# Patient Record
Sex: FEMALE | NOT HISPANIC OR LATINO | URBAN - METROPOLITAN AREA
[De-identification: names, ages, dates, MRNs, and addresses within clinical notes are randomized per-mention and may not be internally consistent; named-entity substitution may affect disease eponyms.]

---

## 2021-11-13 ENCOUNTER — EMERGENCY (EMERGENCY)
Facility: HOSPITAL | Age: 21
LOS: 1 days | Discharge: HOME | End: 2021-11-13
Attending: EMERGENCY MEDICINE | Admitting: EMERGENCY MEDICINE
Payer: SELF-PAY

## 2021-11-13 VITALS
RESPIRATION RATE: 18 BRPM | DIASTOLIC BLOOD PRESSURE: 71 MMHG | HEART RATE: 72 BPM | TEMPERATURE: 98 F | SYSTOLIC BLOOD PRESSURE: 107 MMHG | OXYGEN SATURATION: 98 %

## 2021-11-13 VITALS
OXYGEN SATURATION: 100 % | HEART RATE: 88 BPM | DIASTOLIC BLOOD PRESSURE: 71 MMHG | RESPIRATION RATE: 18 BRPM | SYSTOLIC BLOOD PRESSURE: 104 MMHG | TEMPERATURE: 98 F
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
## 2021-11-13 DIAGNOSIS — F10.129 ALCOHOL ABUSE WITH INTOXICATION, UNSPECIFIED: ICD-10-CM

## 2021-11-13 DIAGNOSIS — R41.82 ALTERED MENTAL STATUS, UNSPECIFIED: ICD-10-CM

## 2021-11-13 PROCEDURE — 99053 MED SERV 10PM-8AM 24 HR FAC: CPT

## 2021-11-13 PROCEDURE — 99284 EMERGENCY DEPT VISIT MOD MDM: CPT

## 2021-11-13 RX ORDER — ONDANSETRON 8 MG/1
4 TABLET, FILM COATED ORAL ONCE
Refills: 0 | Status: COMPLETED | OUTPATIENT
Start: 2021-11-13 | End: 2021-11-13

## 2021-11-13 RX ADMIN — ONDANSETRON 4 MILLIGRAM(S): 8 TABLET, FILM COATED ORAL at 03:21

## 2021-11-13 NOTE — ED ADULT NURSE NOTE - OBJECTIVE STATEMENT
Pt. BIBEMS for AMS. As per EMS, pt. was found "passed out: in bathroom of club. Pt. is unable to answer any questions at this time. Fall precautions in place. Pending sobriety.

## 2021-11-13 NOTE — ED PROVIDER NOTE - PROGRESS NOTE DETAILS
patient awake, coherent, ambulatory, sister here to take her home. has no acute medical complaints. no head injury.

## 2021-11-13 NOTE — ED PROVIDER NOTE - PATIENT PORTAL LINK FT
You can access the FollowMyHealth Patient Portal offered by [facility] by registering at the following website: [email or web address]. By joining [facility]’s FollowMyHealth portal, you will also be able to view your health information using other applications (apps) compatible with our system.

## 2021-11-13 NOTE — ED ADULT NURSE NOTE - NSFALLRSKASSESSTYPE_ED_ALL_ED
Surgical resident notified of emesis after clears.  Electronically signed by Evelyne Gastelum RN on 10/1/2020 at 1:25 PM Initial (On Arrival)
